# Patient Record
Sex: FEMALE | Race: WHITE
[De-identification: names, ages, dates, MRNs, and addresses within clinical notes are randomized per-mention and may not be internally consistent; named-entity substitution may affect disease eponyms.]

---

## 2017-10-12 PROBLEM — Z00.00 ENCOUNTER FOR PREVENTIVE HEALTH EXAMINATION: Status: ACTIVE | Noted: 2017-10-12

## 2018-01-08 ENCOUNTER — APPOINTMENT (OUTPATIENT)
Dept: OBGYN | Facility: CLINIC | Age: 38
End: 2018-01-08

## 2018-02-05 ENCOUNTER — APPOINTMENT (OUTPATIENT)
Dept: OBGYN | Facility: CLINIC | Age: 38
End: 2018-02-05
Payer: COMMERCIAL

## 2018-02-05 VITALS
HEIGHT: 64 IN | WEIGHT: 130 LBS | SYSTOLIC BLOOD PRESSURE: 146 MMHG | BODY MASS INDEX: 22.2 KG/M2 | DIASTOLIC BLOOD PRESSURE: 80 MMHG

## 2018-02-05 DIAGNOSIS — Z80.9 FAMILY HISTORY OF MALIGNANT NEOPLASM, UNSPECIFIED: ICD-10-CM

## 2018-02-05 DIAGNOSIS — B97.7 PAPILLOMAVIRUS AS THE CAUSE OF DISEASES CLASSIFIED ELSEWHERE: ICD-10-CM

## 2018-02-05 DIAGNOSIS — Z80.1 FAMILY HISTORY OF MALIGNANT NEOPLASM OF TRACHEA, BRONCHUS AND LUNG: ICD-10-CM

## 2018-02-05 PROCEDURE — 99385 PREV VISIT NEW AGE 18-39: CPT

## 2018-02-07 LAB — HPV HIGH+LOW RISK DNA PNL CVX: NOT DETECTED

## 2018-02-09 LAB — CYTOLOGY CVX/VAG DOC THIN PREP: NORMAL

## 2018-11-19 ENCOUNTER — APPOINTMENT (OUTPATIENT)
Dept: OBGYN | Facility: CLINIC | Age: 38
End: 2018-11-19
Payer: COMMERCIAL

## 2018-11-19 VITALS
HEIGHT: 64 IN | DIASTOLIC BLOOD PRESSURE: 79 MMHG | WEIGHT: 135 LBS | SYSTOLIC BLOOD PRESSURE: 123 MMHG | BODY MASS INDEX: 23.05 KG/M2

## 2018-11-19 DIAGNOSIS — R10.9 UNSPECIFIED ABDOMINAL PAIN: ICD-10-CM

## 2018-11-19 DIAGNOSIS — R35.0 FREQUENCY OF MICTURITION: ICD-10-CM

## 2018-11-19 PROCEDURE — 99213 OFFICE O/P EST LOW 20 MIN: CPT

## 2018-11-19 RX ORDER — IBUPROFEN 800 MG/1
800 TABLET, FILM COATED ORAL
Qty: 60 | Refills: 0 | Status: ACTIVE | COMMUNITY
Start: 2018-11-08

## 2018-11-27 LAB
BACTERIA UR CULT: NORMAL
CANDIDA VAG CYTO: NOT DETECTED
G VAGINALIS+PREV SP MTYP VAG QL MICRO: NOT DETECTED
T VAGINALIS VAG QL WET PREP: NOT DETECTED

## 2019-06-03 ENCOUNTER — APPOINTMENT (OUTPATIENT)
Dept: OBGYN | Facility: CLINIC | Age: 39
End: 2019-06-03
Payer: COMMERCIAL

## 2019-06-03 VITALS
HEIGHT: 63 IN | BODY MASS INDEX: 24.45 KG/M2 | SYSTOLIC BLOOD PRESSURE: 109 MMHG | WEIGHT: 138 LBS | DIASTOLIC BLOOD PRESSURE: 74 MMHG

## 2019-06-03 DIAGNOSIS — R92.2 INCONCLUSIVE MAMMOGRAM: ICD-10-CM

## 2019-06-03 DIAGNOSIS — Z01.419 ENCOUNTER FOR GYNECOLOGICAL EXAMINATION (GENERAL) (ROUTINE) W/OUT ABNORMAL FINDINGS: ICD-10-CM

## 2019-06-03 PROCEDURE — 99396 PREV VISIT EST AGE 40-64: CPT

## 2019-06-03 NOTE — HISTORY OF PRESENT ILLNESS
[1 Year Ago] : 1 year ago [Good] : being in good health [Healthy Diet] : a healthy diet [Regular Exercise] : regular exercise [Mammogram Last Year] : a mammogram last year [Pap Smear Last Year] : Papanicolaou cytology last year [Weight Concerns] : no concerns with her weight [Menstrual Problems] : reports normal menses [Up to Date] : up to date with ~his/her~ immunizations [Normal Amount/Duration] : was of a normal amount and duration [Regular Cycle Intervals] : periods have been regular [Spotting Between  Menses] : no spotting between menses [Sexually Active] : is sexually active

## 2019-06-03 NOTE — PHYSICAL EXAM
[Awake] : awake [Acute Distress] : no acute distress [Alert] : alert [Mass] : no breast mass [Nipple Discharge] : no nipple discharge [Axillary LAD] : no axillary lymphadenopathy [Oriented x3] : oriented to person, place, and time [Soft] : soft [Tender] : non tender [No Bleeding] : there was no active vaginal bleeding [Normal] : cervix [Uterine Adnexae] : were not tender and not enlarged

## 2019-06-05 LAB — HPV HIGH+LOW RISK DNA PNL CVX: NOT DETECTED

## 2019-06-06 LAB — CYTOLOGY CVX/VAG DOC THIN PREP: NORMAL

## 2019-06-24 ENCOUNTER — APPOINTMENT (OUTPATIENT)
Dept: DERMATOLOGY | Facility: CLINIC | Age: 39
End: 2019-06-24
Payer: COMMERCIAL

## 2019-06-24 PROCEDURE — 99203 OFFICE O/P NEW LOW 30 MIN: CPT

## 2020-02-14 ENCOUNTER — TRANSCRIPTION ENCOUNTER (OUTPATIENT)
Age: 40
End: 2020-02-14

## 2020-02-18 ENCOUNTER — APPOINTMENT (OUTPATIENT)
Dept: OBGYN | Facility: CLINIC | Age: 40
End: 2020-02-18

## 2020-03-04 ENCOUNTER — APPOINTMENT (OUTPATIENT)
Dept: OBGYN | Facility: CLINIC | Age: 40
End: 2020-03-04

## 2020-06-17 ENCOUNTER — APPOINTMENT (OUTPATIENT)
Dept: DERMATOLOGY | Facility: CLINIC | Age: 40
End: 2020-06-17
Payer: COMMERCIAL

## 2020-06-17 PROCEDURE — 99213 OFFICE O/P EST LOW 20 MIN: CPT | Mod: 95

## 2020-08-28 ENCOUNTER — EMERGENCY (EMERGENCY)
Facility: HOSPITAL | Age: 40
LOS: 1 days | Discharge: HOME | End: 2020-08-28
Attending: EMERGENCY MEDICINE
Payer: COMMERCIAL

## 2020-08-28 VITALS
SYSTOLIC BLOOD PRESSURE: 131 MMHG | HEART RATE: 96 BPM | RESPIRATION RATE: 12 BRPM | WEIGHT: 145.95 LBS | HEIGHT: 66 IN | OXYGEN SATURATION: 96 % | TEMPERATURE: 98 F | DIASTOLIC BLOOD PRESSURE: 81 MMHG

## 2020-08-28 VITALS
RESPIRATION RATE: 15 BRPM | TEMPERATURE: 98 F | DIASTOLIC BLOOD PRESSURE: 89 MMHG | OXYGEN SATURATION: 98 % | HEART RATE: 89 BPM | SYSTOLIC BLOOD PRESSURE: 131 MMHG

## 2020-08-28 LAB
ALBUMIN SERPL ELPH-MCNC: 4.4 G/DL — SIGNIFICANT CHANGE UP (ref 3.3–5)
ALP SERPL-CCNC: 52 U/L — SIGNIFICANT CHANGE UP (ref 40–120)
ALT FLD-CCNC: 14 U/L — SIGNIFICANT CHANGE UP (ref 10–45)
ANION GAP SERPL CALC-SCNC: 8 MMOL/L — SIGNIFICANT CHANGE UP (ref 5–17)
AST SERPL-CCNC: 19 U/L — SIGNIFICANT CHANGE UP (ref 10–40)
BILIRUB SERPL-MCNC: 0.4 MG/DL — SIGNIFICANT CHANGE UP (ref 0.2–1.2)
BUN SERPL-MCNC: 12 MG/DL — SIGNIFICANT CHANGE UP (ref 7–23)
CALCIUM SERPL-MCNC: 9.6 MG/DL — SIGNIFICANT CHANGE UP (ref 8.4–10.5)
CHLORIDE SERPL-SCNC: 105 MMOL/L — SIGNIFICANT CHANGE UP (ref 96–108)
CK SERPL-CCNC: 49 U/L — SIGNIFICANT CHANGE UP (ref 25–170)
CO2 SERPL-SCNC: 28 MMOL/L — SIGNIFICANT CHANGE UP (ref 22–31)
CREAT SERPL-MCNC: 0.7 MG/DL — SIGNIFICANT CHANGE UP (ref 0.5–1.3)
CRP SERPL-MCNC: <0.06 MG/DL — SIGNIFICANT CHANGE UP (ref 0–0.4)
GLUCOSE SERPL-MCNC: 94 MG/DL — SIGNIFICANT CHANGE UP (ref 70–99)
HCG UR QL: NEGATIVE — SIGNIFICANT CHANGE UP
POTASSIUM SERPL-MCNC: 4 MMOL/L — SIGNIFICANT CHANGE UP (ref 3.5–5.3)
POTASSIUM SERPL-SCNC: 4 MMOL/L — SIGNIFICANT CHANGE UP (ref 3.5–5.3)
PROT SERPL-MCNC: 6.7 G/DL — SIGNIFICANT CHANGE UP (ref 6–8.3)
SODIUM SERPL-SCNC: 141 MMOL/L — SIGNIFICANT CHANGE UP (ref 135–145)

## 2020-08-28 PROCEDURE — 86780 TREPONEMA PALLIDUM: CPT

## 2020-08-28 PROCEDURE — 99284 EMERGENCY DEPT VISIT MOD MDM: CPT

## 2020-08-28 PROCEDURE — 80061 LIPID PANEL: CPT

## 2020-08-28 PROCEDURE — 85652 RBC SED RATE AUTOMATED: CPT

## 2020-08-28 PROCEDURE — 80053 COMPREHEN METABOLIC PANEL: CPT

## 2020-08-28 PROCEDURE — 81025 URINE PREGNANCY TEST: CPT

## 2020-08-28 PROCEDURE — 82550 ASSAY OF CK (CPK): CPT

## 2020-08-28 PROCEDURE — 83036 HEMOGLOBIN GLYCOSYLATED A1C: CPT

## 2020-08-28 PROCEDURE — 82607 VITAMIN B-12: CPT

## 2020-08-28 PROCEDURE — 86140 C-REACTIVE PROTEIN: CPT

## 2020-08-28 PROCEDURE — 82746 ASSAY OF FOLIC ACID SERUM: CPT

## 2020-08-28 NOTE — ED PROVIDER NOTE - ATTENDING CONTRIBUTION TO CARE
------------ATTENDING NOTE------------   pt c/o christina ------------ATTENDING NOTE------------   pt c/o one week of constant worsening numbness/tingling in bilateral feet, associated difficulty walking, no DM, no alcohol use, no drug/intoxicant use, no edema/calf tenderness or color changes, +2 DP/PT pulses, benign back exam, no incontinence/retention, recent cmp/cbc/tsh wnl, ED Sign Out pending labs/Neuro consult for recs/dispo.  - Ricardo Barahona MD   -----------------------------------------------

## 2020-08-28 NOTE — ED PROVIDER NOTE - OBJECTIVE STATEMENT
41 y/o F denies significant PMH presents to ED with b/l lower extremity pain, numbness, burning, tingling worsening over the last few weeks. Patient states that she had atraumatic R foot swelling and pain a few weeks ago. Went to a podiatrist, who performed a xay. Patient was told he was a neuroma. Placed on a steroid pack. Patient states that since then pain has worsened, with numbness and tingling. In the last week patient states that pain, numbness, tingling, burning has migrated also to left lower extremity. Patient has received outpatient bloodwork via PCP, per patient was negative.   States that she had full workup with blood counts, inflammatory markers, and testing for lyme disease, all returning negative.   Currently, patient states that she is experiencing right foot numbness and left foot and lower extremity pain. Declines pain medication.   PSH: Tonsillectomy   Allergies: Sulfa drugs  to be completed.. 41 y/o F denies significant PMH presents to ED with b/l lower extremity pain, numbness, burning, tingling worsening over the last few weeks. Patient states that she had atraumatic R foot swelling and pain a few weeks ago. Went to a podiatrist, who performed a xray. Patient was told he was a neuroma. Placed on a steroid pack. Patient states that since then pain has worsened, with numbness and tingling. In the last week patient states that pain, numbness, tingling, burning has migrated also to left lower extremity. Patient has received outpatient blood work via PCP, per patient was negative.   States that she had full workup with blood counts, inflammatory markers, and testing for lyme disease, all returning negative.   Currently, patient states that she is experiencing right foot numbness and left foot and lower extremity pain. Declines pain medication.   PSH: Tonsillectomy   Allergies: Sulfa drugs  Denies chest pain, SOB, fever, urinary symptom, abdominal pain, vomiting, headache, dizziness.

## 2020-08-28 NOTE — ED PROVIDER NOTE - NSFOLLOWUPCLINICS_GEN_ALL_ED_FT
[facility] Gynecology and Obstetrics  Gynceology/OB  [address] [place] [address]  Phone: [phone]  Fax:   Follow Up Time: Urgent [facility]  Neurology  [address] [place] [address] [address] [place] [address]  Phone: [phone]  Fax:   Follow Up Time:

## 2020-08-28 NOTE — CONSULT NOTE ADULT - ASSESSMENT
Patient is a 39 y/o female with PMH remote EBV infection (~15 years ago) who presents with lower extremity pain, numbness, and tingling for two weeks. Neurological exam was significant for hyperesthesia of her left lower leg below the knee compared to her right lower extremity. Patient did not want to stay for further imaging after discussion of risks and benefits. Labs were collected prior to discharge.    Impression: Peripheral neuropathy vs myopathy, unknown etiology; DDX includes undiagnosed T2DM vs. vitamin B12 deficiency vs. other axonal vs. demyelinating neuropathy vs. Raynaud's phenomenon vs. psychosomatic condition (diagnosis of exclusion), less likely autoimmune etiology or peripheral vascular disease    Recommendations:  -Vitamin B12, MMA, homocysteine, CK, HbA1C , ESR, CRP  -Follow up with Dr. Nissenbaum as outpatient at [facility], [place] [address]; [phone]  -Patient can follow up with Neuromuscular specialist, Dr. Messer, as outpatient for NCS/EMG at [address] [place] [address]. ([phone]).    Patient discussed with Dr. Moyer, Neurology Attending.    Thank you.  Ana Otero,   PGY-2  Neurology Resident

## 2020-08-28 NOTE — ED PROVIDER NOTE - PATIENT PORTAL LINK FT
You can access the FollowMyHealth Patient Portal offered by [facility] by registering at the following website: [email or web address]. By joining [employer]’s FollowMyHealth portal, you will also be able to view your health information using other applications (apps) compatible with our system. You can access the FollowMyHealth Patient Portal offered by [facility] by registering at the following website: [email or web address]. By joining [employer]’s FollowMyHealth portal, you will also be able to view your health information using other applications (apps) compatible with our system.

## 2020-08-28 NOTE — ED ADULT NURSE REASSESSMENT NOTE - NS ED NURSE REASSESS COMMENT FT1
report received from barbara banegas. patient resting on stretcher. patient complaining of bilateral lower extremity pain/discomfort. as per ABIDA Cobb patient refusing care at this time. will continue to monitor. patient comfort and safety provided.

## 2020-08-28 NOTE — ED PROVIDER NOTE - PHYSICAL EXAMINATION
GEN: Anxious, Nontoxic, NAD  HEENT: NC/AT, Symm Facies. PERRL, EOMI  CV: No JVD/Bruits or stridor;  +S1S2, RRR w/o m/g/r  RESP: CTAB w/o w/r/r  ABD: Soft, nt/nd, +BS. No guarding/rebound. No RUQ tender, no CVAT  EXT/MSK: No lower extremity edema or calf tenderness.  FROMx4  SKIN: Warm   Neuro: Grossly intact, AOX3 with normal speech, Sensation intact, motor equal b/l, equal  strength GEN: Anxious, Nontoxic, NAD  HEENT: NC/AT, Symm Facies. PERRL, EOMI  CV: No JVD/Bruits or stridor;  +S1S2, RRR w/o m/g/r  RESP: CTAB w/o w/r/r  ABD: Soft, nt/nd, +BS. No guarding/rebound. No RUQ tender, no CVAT  EXT/MSK: No lower extremity edema or calf tenderness.  FROMx4  SKIN: Warm   Neuro: Grossly intact, AOX3 with normal speech, Sensation equal b/l, motor equal b/l, equal  strength

## 2020-08-28 NOTE — CONSULT NOTE ADULT - SUBJECTIVE AND OBJECTIVE BOX
HPI:  Patient is a 41 y/o female with PMH remote EBV infection (~15 years ago) who presents with lower extremity pain, numbness, and tingling for two weeks. Patient reports she has had symptoms of both feet since Saturday, pain rated an 8/10, for which she was taking OTC medications. The initial presentation started two weeks ago with numbness and tingling of her right foot, with absence of any pain. Patient denies trauma to the foot, however when she was dancing 1 month ago, she rolled over her foot (not ankle). She did not have trouble bearing weight on it immediately afterwards. Patient did not note any swelling, but what appeared to be bruising in the area. She had an outpatient X-Ray of the foot, which was negative for acute fracture. Patient was diagnosed with a neuroma between the third and fourth digits of the foot and completed a 6-day course of steroids. Patient describes a cramping sensation like a Charley Horse associated with the paresthesias that radiated to the lateral part of her lower leg starting Saturday, 8/22/20, which progressed to include her left foot as well. Since then, she has felt both of her legs are feel heavier and weaker. Of note, patient had a workup done for peripheral neuropathy as outpatient: EMMANUEL, RF, TSH, CBC, BMP were unremarkable.    Patient has been home since the onset of COVID in March, she used to work as a . She admits to. She denies back pain, but occasionally has muscle pain of her left buttock that is not accompanied with burning radiation down her leg. She attributes the pain also to sitting an excessive amount during the day, more than usual since prior to COVID.    Patient denies vision changes, headaches, chest pain, palpitations, dizziness, abdominal pain, nausea, vomiting, recent sickness, fevers, chills, weight loss, cough, shortness of breath, changes in diet, and sick contacts.    PAST MEDICAL & SURGICAL HISTORY:  No pertinent past medical history  No significant past surgical history    Allergies  sulfa drugs (Anaphylaxis)    SHx - No smoking, No ETOH, No drug abuse    Review of Systems:  HEENT:  No visual loss, blurred vision, double vision.  No hearing loss, sneezing, congestion, runny nose or sore throat.  SKIN:  No rash or itching.  CARDIOVASCULAR:  No chest pain, chest pressure or chest discomfort. No palpitations or edema.  RESPIRATORY:  No shortness of breath, cough or sputum.  GASTROINTESTINAL:  No anorexia, nausea, vomiting or diarrhea. No abdominal pain.  GENITOURINARY:  No dysuria. No increased frequency. No retention. No incontinence.  NEUROLOGICAL:  See HPI  MUSCULOSKELETAL:  No muscle, back pain, joint pain or stiffness.    Vital Signs Last 24 Hrs  T(C): 36.8 (28 Aug 2020 19:21), Max: 36.8 (28 Aug 2020 15:24)  T(F): 98.2 (28 Aug 2020 19:21), Max: 98.2 (28 Aug 2020 15:24)  HR: 89 (28 Aug 2020 19:21) (89 - 96)  BP: 131/89 (28 Aug 2020 19:21) (131/81 - 131/89)  BP(mean): --  RR: 15 (28 Aug 2020 19:21) (12 - 15)  SpO2: 98% (28 Aug 2020 19:21) (96% - 98%)    PHYSICAL EXAM:  GENERAL: No acute distress, however very anxious  HEENT: Normocephalic; conjunctivae and sclerae clear; moist mucous membranes  NECK: Supple  ABDOMEN: Soft, non-tender, non-distended  EXTREMITIES: No cyanosis; no edema; no calf tenderness; no lower extremity swelling or erythema  SKIN: Warm and dry; no rash             Neurological Exam:  - Mental Status: Alert and oriented to person, place, month, day, year, president, situation; speech is fluent with intact naming, repetition, and comprehension  - Cranial Nerves II-XII:    II:  PERRLA; visual fields are full to confrontation  III, IV, VI:  EOMI, no nystagmus  V:  facial sensation is intact in the V1-V3 distribution bilaterally.  VII:  face is symmetric with normal eye closure and smile  VIII:  hearing is intact to finger rub  IX, X:  uvula is midline and soft palate rises symmetrically  XI:  head turning and shoulder shrug are intact bilaterally  XII:  tongue protrudes in the midline  - Motor:  strength is 5/5 throughout; normal muscle bulk and tone throughout; no pronator drift  - Reflexes:  2+ and symmetric at the biceps, triceps, brachioradialis, knees, and ankles;  plantar reflexes downgoing bilaterally  - Sensory: increased sensitivity to light touch and pin prick of LLE below the knee compared to RLE; upper extremity sensation intact and symmetrical to light touch and pinprick; vibration and joint-position intact bilaterally  - Coordination:  finger-nose-finger and heel-knee-shin intact without dysmetria; rapid alternating hand movements intact  - Gait:  normal steps, base, arm swing, and turning; heel and toe walking are normal; tandem gait is normal; Romberg testing is negative    Other:    08-28    141  |  105  |  12  ----------------------------<  94  4.0   |  28  |  0.70    Ca    9.6      28 Aug 2020 18:01    TPro  6.7  /  Alb  4.4  /  TBili  0.4  /  DBili  x   /  AST  19  /  ALT  14  /  AlkPhos  52  08-28

## 2020-08-28 NOTE — ED PROVIDER NOTE - PROGRESS NOTE DETAILS
Spoke with ABIDA Thurston from [facility] (patient follows with a ABIDA ELIZONDO). States that CBC, CMP, RF, EMMANUEL, ESR, Lyme titer, TSH, was negative. EBV + for past infection. - ARELI PrattC Spoke with neurology, consult placed for peripheral neuropathy and gait disturbances. - Marcela Hernandez PA-C pt was offered CDU for MRI however she prefers to follow up as outpt. discussed with neurology, given patient able to ambulate and no acute prgoressive neurologic findings, appropriate. labs were drawn and sent. SHEBA Spoke with neurology resident Dr. Otero. Recommending Folate, Lipid, A1C, B12, CRP, CK, ESR. States that patient can follow up outpatient in clinic within the week. Outpatient EMG TBD. LUZMA Truong, patient to be dc home. - Marcela Hernandez PA-C

## 2020-08-28 NOTE — ED PROVIDER NOTE - NSFOLLOWUPINSTRUCTIONS_ED_ALL_ED_FT
See your Primary Doctor and Gynecology this week -- call to discuss.    See HEART BURN and ABDOMINAL PAIN information and return instructions given to you.    Seek immediate medical care for new/worsening symptoms/concerns. 1. Rest. Stay hydrated.   2. Continue your current home medications.  3. Follow-up with your medical doctor in 2-3 days.  Please follow up with Dr. Nissenbaum (neurology) within 1 week, call the phone number above. Bring your results with you for follow-up. Outpatient EMG to be determined.  4. Return to the ER if you have any new or worsening symptoms, incontinence, falls, worsening pain, change in skin color, chest pain, difficulty breathing, fevers, chills, weakness, or any other concerns.

## 2020-08-28 NOTE — ED PROVIDER NOTE - CARE PLAN
Plastic Surgery Principal Discharge DX:	Dyspepsia  Secondary Diagnosis:	Menstrual cramps Principal Discharge DX:	Neuropathy

## 2020-08-28 NOTE — ED PROVIDER NOTE - NS ED ROS FT
Constitutional: No fever or chills  Eyes: No visual changes  CV: No chest pain or lower extremity edema  Resp: No SOB no cough  GI: No abd pain. No nausea or vomiting.   : No dysuria, hematuria.   MSK: +lower extremity pain  Skin: No rash  Psych: No complaints   Neuro: No headache. +numbness +tingling. + weakness.

## 2020-08-28 NOTE — ED ADULT NURSE NOTE - OBJECTIVE STATEMENT
40 year old female presetns to the ED complaining of pain in the lower extremeties that started near the toes of her right foot x 6 days ago and has moved to be bilateral and ascending up her legs. Pt descibs the pain as, "I feel like it's almost a burining, at night it's worse like 40 year old female presents to the ED complaining of pain in the lower extremities that started near the toes of her right foot x 6 days ago and has moved to be bilateral and ascending up her legs. Pt describes the pain as, "I feel like it's almost a burning, at night it's worse like the bone is being cut". Pt is A&O x 4, VSS, afebrile, ambulating independently. Pt denies fever, chills, NVD, SOB, or chest pain. IV placed, labs drawn, bed in low position, safety measures in place. Pt was treated with 6 days of steroids with no improvement.

## 2020-08-28 NOTE — CONSULT NOTE ADULT - NSHPATTENDINGPLANDISCUSS_GEN_ALL_CORE
neurology resident yesterday evening on call and I agree with assessment and plan and outpatient neurology follow up and all information given.

## 2020-08-29 LAB
A1C WITH ESTIMATED AVERAGE GLUCOSE RESULT: 5.2 % — SIGNIFICANT CHANGE UP (ref 4–5.6)
CHOLEST SERPL-MCNC: 173 MG/DL — SIGNIFICANT CHANGE UP (ref 10–199)
ERYTHROCYTE [SEDIMENTATION RATE] IN BLOOD: 8 MM/HR — SIGNIFICANT CHANGE UP (ref 0–15)
ESTIMATED AVERAGE GLUCOSE: 103 MG/DL — SIGNIFICANT CHANGE UP (ref 68–114)
FOLATE SERPL-MCNC: >20 NG/ML — SIGNIFICANT CHANGE UP
HDLC SERPL-MCNC: 61 MG/DL — SIGNIFICANT CHANGE UP
LIPID PNL WITH DIRECT LDL SERPL: 98 MG/DL — SIGNIFICANT CHANGE UP
T PALLIDUM AB TITR SER: NEGATIVE — SIGNIFICANT CHANGE UP
TOTAL CHOLESTEROL/HDL RATIO MEASUREMENT: 2.8 RATIO — LOW (ref 3.3–7.1)
TRIGL SERPL-MCNC: 68 MG/DL — SIGNIFICANT CHANGE UP (ref 10–149)
VIT B12 SERPL-MCNC: 716 PG/ML — SIGNIFICANT CHANGE UP (ref 232–1245)

## 2020-08-31 ENCOUNTER — APPOINTMENT (OUTPATIENT)
Dept: NEUROLOGY | Facility: CLINIC | Age: 40
End: 2020-08-31
Payer: COMMERCIAL

## 2020-08-31 VITALS
WEIGHT: 146 LBS | SYSTOLIC BLOOD PRESSURE: 114 MMHG | HEIGHT: 63 IN | BODY MASS INDEX: 25.87 KG/M2 | TEMPERATURE: 97.9 F | HEART RATE: 82 BPM | DIASTOLIC BLOOD PRESSURE: 74 MMHG

## 2020-08-31 DIAGNOSIS — M79.672 PAIN IN RIGHT LEG: ICD-10-CM

## 2020-08-31 DIAGNOSIS — M79.605 PAIN IN RIGHT LEG: ICD-10-CM

## 2020-08-31 DIAGNOSIS — M79.604 PAIN IN RIGHT LEG: ICD-10-CM

## 2020-08-31 DIAGNOSIS — M54.42 LUMBAGO WITH SCIATICA, LEFT SIDE: ICD-10-CM

## 2020-08-31 DIAGNOSIS — R20.2 ANESTHESIA OF SKIN: ICD-10-CM

## 2020-08-31 DIAGNOSIS — M79.671 PAIN IN RIGHT LEG: ICD-10-CM

## 2020-08-31 DIAGNOSIS — M21.372 FOOT DROP, LEFT FOOT: ICD-10-CM

## 2020-08-31 DIAGNOSIS — R20.0 ANESTHESIA OF SKIN: ICD-10-CM

## 2020-08-31 PROBLEM — Z78.9 OTHER SPECIFIED HEALTH STATUS: Chronic | Status: ACTIVE | Noted: 2020-08-28

## 2020-08-31 PROCEDURE — 99204 OFFICE O/P NEW MOD 45 MIN: CPT

## 2020-08-31 NOTE — REVIEW OF SYSTEMS
[Numbness] : numbness [Abnormal Sensation] : an abnormal sensation [Tingling] : tingling [Difficulty Walking] : difficulty walking [de-identified] : Left foot feels different\par LBP [Negative] : Heme/Lymph

## 2020-08-31 NOTE — PHYSICAL EXAM
[General Appearance - Alert] : alert [General Appearance - In No Acute Distress] : in no acute distress [Oriented To Time, Place, And Person] : oriented to person, place, and time [Impaired Insight] : insight and judgment were intact [Affect] : the affect was normal [FreeTextEntry1] : Anxious [Person] : oriented to person [Place] : oriented to place [Time] : oriented to time [Naming Objects] : no difficulty naming common objects [Concentration Intact] : normal concentrating ability [Visual Intact] : visual attention was ~T not ~L decreased [Writing A Sentence] : no difficulty writing a sentence [Repeating Phrases] : no difficulty repeating a phrase [Fluency] : fluency intact [Comprehension] : comprehension intact [Reading] : reading intact [Past History] : adequate knowledge of personal past history [Cranial Nerves Optic (II)] : visual acuity intact bilaterally,  visual fields full to confrontation, pupils equal round and reactive to light [Cranial Nerves Oculomotor (III)] : extraocular motion intact [Cranial Nerves Trigeminal (V)] : facial sensation intact symmetrically [Cranial Nerves Glossopharyngeal (IX)] : tongue and palate midline [Cranial Nerves Vestibulocochlear (VIII)] : hearing was intact bilaterally [Cranial Nerves Facial (VII)] : face symmetrical [Cranial Nerves Hypoglossal (XII)] : there was no tongue deviation with protrusion [Cranial Nerves Accessory (XI - Cranial And Spinal)] : head turning and shoulder shrug symmetric [Motor Strength] : muscle strength was normal in all four extremities [No Muscle Atrophy] : normal bulk in all four extremities [Sensation Tactile Decrease] : light touch was intact [Hyperesthesia] : hyperesthesia was present [Balance] : balance was intact [Past-pointing] : there was no past-pointing [Tremor] : no tremor present [0] : Ankle jerk left 0 [2+] : Ankle jerk right 2+ [Plantar Reflex Left Only] : normal on the left [Plantar Reflex Right Only] : normal on the right [FreeTextEntry7] : Left Leg and foot  [FreeTextEntry6] : Left toes dorsiflexor weakness  [FreeTextEntry8] : Able to ambulate with heel and toe walk [PERRL With Normal Accommodation] : pupils were equal in size, round, reactive to light, with normal accommodation [Sclera] : the sclera and conjunctiva were normal [Outer Ear] : the ears and nose were normal in appearance [Extraocular Movements] : extraocular movements were intact [Neck Cervical Mass (___cm)] : no neck mass was observed [Neck Appearance] : the appearance of the neck was normal [Oropharynx] : the oropharynx was normal [Jugular Venous Distention Increased] : there was no jugular-venous distention [Thyroid Diffuse Enlargement] : the thyroid was not enlarged [Thyroid Nodule] : there were no palpable thyroid nodules [Auscultation Breath Sounds / Voice Sounds] : lungs were clear to auscultation bilaterally [Heart Rate And Rhythm] : heart rate was normal and rhythm regular [Heart Sounds] : normal S1 and S2 [Heart Sounds Gallop] : no gallops [Heart Sounds Pericardial Friction Rub] : no pericardial rub [Murmurs] : no murmurs [Full Pulse] : the pedal pulses are present [Edema] : there was no peripheral edema [Bowel Sounds] : normal bowel sounds [Abdomen Soft] : soft [Abdomen Tenderness] : non-tender [Abdomen Mass (___ Cm)] : no abdominal mass palpated [No CVA Tenderness] : no ~M costovertebral angle tenderness [No Spinal Tenderness] : no spinal tenderness [Abnormal Walk] : normal gait [Nail Clubbing] : no clubbing  or cyanosis of the fingernails [Motor Tone] : muscle strength and tone were normal [Musculoskeletal - Swelling] : no joint swelling seen [Skin Turgor] : normal skin turgor [Skin Color & Pigmentation] : normal skin color and pigmentation [] : no rash

## 2020-08-31 NOTE — REASON FOR VISIT
[Consultation] : a consultation visit [FreeTextEntry1] : Evaluation for paresthesias and tingling of left > Rt foot for 2 weeks

## 2020-08-31 NOTE — HISTORY OF PRESENT ILLNESS
[FreeTextEntry1] : She is a 40-year-old patient coming here for evaluation of bilateral foot numbness and tingling for 2 weeks. Patient was seen and evaluated [facility] ED by neurology resident symptoms started 2 weeks prior to ED evaluation started with pain and tingling involving right foot but the pain initially while she was doing a online yoga/dance course. Seen by Podiatrist who did x-rays and told her that she might had a neuroma and recommended followup evaluation which patient did not go and instead went for massage therapy as she felt her symptoms and left foot subsequently and back pain started. Symptoms improved initially and then bilateral lower extremity symptoms progressed with burning sensation and radiating pain started with paresthesias in both feet. For which she went to the ED where she was evaluated.\par Recommend an MRI scan but she did not want to wait and left the ER.\par Denies of any upper extremity symptoms or headaches. No dizziness or other focal symptoms.\par Extremely anxious and nervous. No bladder or bowel symptoms. Feels that "her left foot doesn't feel the same"as the right.

## 2020-09-04 ENCOUNTER — RESULT REVIEW (OUTPATIENT)
Age: 40
End: 2020-09-04

## 2020-09-04 ENCOUNTER — APPOINTMENT (OUTPATIENT)
Dept: MRI IMAGING | Facility: CLINIC | Age: 40
End: 2020-09-04

## 2020-09-04 ENCOUNTER — OUTPATIENT (OUTPATIENT)
Dept: OUTPATIENT SERVICES | Facility: HOSPITAL | Age: 40
LOS: 1 days | End: 2020-09-04
Payer: COMMERCIAL

## 2020-09-04 ENCOUNTER — APPOINTMENT (OUTPATIENT)
Dept: MRI IMAGING | Facility: HOSPITAL | Age: 40
End: 2020-09-04
Payer: COMMERCIAL

## 2020-09-04 DIAGNOSIS — M54.42 LUMBAGO WITH SCIATICA, LEFT SIDE: ICD-10-CM

## 2020-09-04 PROCEDURE — 72148 MRI LUMBAR SPINE W/O DYE: CPT | Mod: 26

## 2020-09-04 PROCEDURE — 72146 MRI CHEST SPINE W/O DYE: CPT | Mod: 26

## 2020-09-04 PROCEDURE — 72148 MRI LUMBAR SPINE W/O DYE: CPT

## 2020-09-04 PROCEDURE — 72146 MRI CHEST SPINE W/O DYE: CPT

## 2021-04-21 ENCOUNTER — APPOINTMENT (OUTPATIENT)
Dept: DERMATOLOGY | Facility: CLINIC | Age: 41
End: 2021-04-21
Payer: COMMERCIAL

## 2021-04-21 PROCEDURE — 99213 OFFICE O/P EST LOW 20 MIN: CPT

## 2021-04-21 PROCEDURE — 99072 ADDL SUPL MATRL&STAF TM PHE: CPT

## 2021-12-08 ENCOUNTER — NON-APPOINTMENT (OUTPATIENT)
Age: 41
End: 2021-12-08

## 2022-03-30 ENCOUNTER — APPOINTMENT (OUTPATIENT)
Dept: DERMATOLOGY | Facility: CLINIC | Age: 42
End: 2022-03-30
Payer: COMMERCIAL

## 2022-03-30 PROCEDURE — 99213 OFFICE O/P EST LOW 20 MIN: CPT

## 2022-04-22 ENCOUNTER — TRANSCRIPTION ENCOUNTER (OUTPATIENT)
Age: 42
End: 2022-04-22

## 2022-04-25 ENCOUNTER — APPOINTMENT (OUTPATIENT)
Dept: DERMATOLOGY | Facility: CLINIC | Age: 42
End: 2022-04-25
Payer: COMMERCIAL

## 2022-04-25 PROCEDURE — 99213 OFFICE O/P EST LOW 20 MIN: CPT

## 2023-07-21 NOTE — ED ADULT NURSE NOTE - NS ED NURSE RECORD ANOTHER VITAL SIGN
Include J-Code In Bill: No Date Of Next Injection: Other Time Frame (Enter Below) J-Code:  20097 Billing Preferences: 1 Ndc (300 Mg Prefilled Pen): [address]-88 Use Enhanced Ndc?: Yes Expiration Date (Optional): 10/2025 Yes Other Time Frame Value: 1 month Ndc (300 Mg Prefilled Syringe): [address]-54 Detail Level: None Consent: The risks of pain and injection site reactions were reviewed with the patient prior to the injection. Dupixent Amount: 300 mg Syringe Size Used (Required For Enhanced Ndc): 300 mg/2ml prefilled syringe Lot # (Optional): [id number] Administered By (Optional): Dr. Castano Ndc (200 Mg Prefilled Syringe): [address]-79

## 2023-09-27 ENCOUNTER — APPOINTMENT (OUTPATIENT)
Dept: DERMATOLOGY | Facility: CLINIC | Age: 43
End: 2023-09-27
Payer: COMMERCIAL

## 2023-09-27 PROCEDURE — 99213 OFFICE O/P EST LOW 20 MIN: CPT

## 2024-03-06 ENCOUNTER — APPOINTMENT (OUTPATIENT)
Dept: DERMATOLOGY | Facility: CLINIC | Age: 44
End: 2024-03-06
Payer: COMMERCIAL

## 2024-03-06 PROCEDURE — 11102 TANGNTL BX SKIN SINGLE LES: CPT

## 2024-03-06 PROCEDURE — 99213 OFFICE O/P EST LOW 20 MIN: CPT | Mod: 25

## 2024-03-14 ENCOUNTER — OFFICE (OUTPATIENT)
Dept: URBAN - METROPOLITAN AREA CLINIC 114 | Facility: CLINIC | Age: 44
Setting detail: OPHTHALMOLOGY
End: 2024-03-14
Payer: COMMERCIAL

## 2024-03-14 DIAGNOSIS — H01.005: ICD-10-CM

## 2024-03-14 DIAGNOSIS — H04.123: ICD-10-CM

## 2024-03-14 DIAGNOSIS — H01.002: ICD-10-CM

## 2024-03-14 PROCEDURE — 92014 COMPRE OPH EXAM EST PT 1/>: CPT | Performed by: SPECIALIST

## 2024-03-14 ASSESSMENT — REFRACTION_MANIFEST
OS_SPHERE: -3.00
OD_AXIS: 110
OS_CYLINDER: -0.75
OD_VA1: 20/20
OS_VA1: 20/20
OS_AXIS: 075
OD_CYLINDER: -1.00
OD_SPHERE: -3.00

## 2024-03-14 ASSESSMENT — REFRACTION_CURRENTRX
OD_SPHERE: -2.50
OS_AXIS: 072
OS_SPHERE: -3.00
OD_CYLINDER: -1.00
OD_AXIS: 137
OS_OVR_VA: 20/
OD_SPHERE: -3.00
OS_OVR_VA: 20/
OD_OVR_VA: 20/
OS_SPHERE: -2.50
OD_CYLINDER: -1.00
OS_CYLINDER: -0.50
OD_OVR_VA: 20/
OD_AXIS: 137
OS_CYLINDER: -0.75
OS_AXIS: 072

## 2024-03-14 ASSESSMENT — SPHEQUIV_DERIVED
OD_SPHEQUIV: -3.5
OS_SPHEQUIV: -3.375

## 2024-03-14 ASSESSMENT — LID EXAM ASSESSMENTS
OS_BLEPHARITIS: LLL T
OD_BLEPHARITIS: RLL T

## 2024-10-23 ENCOUNTER — APPOINTMENT (OUTPATIENT)
Dept: DERMATOLOGY | Facility: CLINIC | Age: 44
End: 2024-10-23
Payer: COMMERCIAL

## 2024-10-23 PROCEDURE — 99213 OFFICE O/P EST LOW 20 MIN: CPT

## 2025-03-27 ENCOUNTER — OFFICE (OUTPATIENT)
Dept: URBAN - METROPOLITAN AREA CLINIC 114 | Facility: CLINIC | Age: 45
Setting detail: OPHTHALMOLOGY
End: 2025-03-27
Payer: COMMERCIAL

## 2025-03-27 DIAGNOSIS — H01.002: ICD-10-CM

## 2025-03-27 DIAGNOSIS — H01.005: ICD-10-CM

## 2025-03-27 DIAGNOSIS — H04.123: ICD-10-CM

## 2025-03-27 PROCEDURE — 92014 COMPRE OPH EXAM EST PT 1/>: CPT | Performed by: SPECIALIST

## 2025-03-27 ASSESSMENT — REFRACTION_CURRENTRX
OD_VPRISM_DIRECTION: SV
OS_CYLINDER: -0.75
OS_OVR_VA: 20/
OS_SPHERE: -3.00
OD_OVR_VA: 20/
OS_OVR_VA: 20/
OS_VPRISM_DIRECTION: SV
OD_AXIS: 118
OD_CYLINDER: -1.00
OD_SPHERE: -3.00
OD_OVR_VA: 20/
OS_AXIS: 077

## 2025-03-27 ASSESSMENT — TONOMETRY
OS_IOP_MMHG: 18
OD_IOP_MMHG: 17

## 2025-03-27 ASSESSMENT — LID EXAM ASSESSMENTS
OD_BLEPHARITIS: RLL T
OS_BLEPHARITIS: LLL T

## 2025-03-27 ASSESSMENT — REFRACTION_MANIFEST
OD_CYLINDER: -1.50
OS_AXIS: 075
OS_VA1: 20/20
OD_AXIS: 115
OD_VA1: 20/20
OD_SPHERE: -3.25
OS_CYLINDER: -0.75
OS_SPHERE: -3.00

## 2025-03-27 ASSESSMENT — SUPERFICIAL PUNCTATE KERATITIS (SPK)
OD_SPK: ABSENT
OS_SPK: ABSENT

## 2025-03-27 ASSESSMENT — CONFRONTATIONAL VISUAL FIELD TEST (CVF)
OS_FINDINGS: FULL
OD_FINDINGS: FULL

## 2025-03-27 ASSESSMENT — REFRACTION_AUTOREFRACTION
OS_AXIS: 071
OS_CYLINDER: -1.00
OD_CYLINDER: -1.75
OS_SPHERE: -3.50
OD_SPHERE: -3.25
OD_AXIS: 114

## 2025-03-27 ASSESSMENT — VISUAL ACUITY
OS_BCVA: 20/20-2
OD_BCVA: 20/20

## 2025-06-24 ENCOUNTER — OFFICE (OUTPATIENT)
Dept: URBAN - METROPOLITAN AREA CLINIC 104 | Facility: CLINIC | Age: 45
Setting detail: OPHTHALMOLOGY
End: 2025-06-24
Payer: COMMERCIAL

## 2025-06-24 DIAGNOSIS — S05.02XA: ICD-10-CM

## 2025-06-24 DIAGNOSIS — H57.12: ICD-10-CM

## 2025-06-24 PROCEDURE — 92012 INTRM OPH EXAM EST PATIENT: CPT | Performed by: SPECIALIST

## 2025-06-24 ASSESSMENT — LID EXAM ASSESSMENTS
OS_BLEPHARITIS: LLL T
OD_BLEPHARITIS: RLL T

## 2025-06-24 ASSESSMENT — CONFRONTATIONAL VISUAL FIELD TEST (CVF)
OD_FINDINGS: FULL
OS_FINDINGS: FULL

## 2025-06-24 ASSESSMENT — VISUAL ACUITY
OD_BCVA: 20/25+1
OS_BCVA: 20/25-2

## 2025-06-24 ASSESSMENT — TONOMETRY: OS_IOP_MMHG: 16

## 2025-06-24 ASSESSMENT — SUPERFICIAL PUNCTATE KERATITIS (SPK)
OD_SPK: ABSENT
OS_SPK: ABSENT

## 2025-06-24 ASSESSMENT — CORNEAL TRAUMA - ABRASION: OS_ABRASION: PRESENT

## 2025-06-26 ENCOUNTER — RX ONLY (RX ONLY)
Age: 45
End: 2025-06-26

## 2025-06-26 ENCOUNTER — OFFICE (OUTPATIENT)
Dept: URBAN - METROPOLITAN AREA CLINIC 114 | Facility: CLINIC | Age: 45
Setting detail: OPHTHALMOLOGY
End: 2025-06-26
Payer: COMMERCIAL

## 2025-06-26 DIAGNOSIS — H57.12: ICD-10-CM

## 2025-06-26 DIAGNOSIS — S05.02XA: ICD-10-CM

## 2025-06-26 PROBLEM — H57.8A2 FOREIGN BODY SENSATION, LEFT EYE: Status: ACTIVE | Noted: 2025-06-24

## 2025-06-26 PROCEDURE — 92012 INTRM OPH EXAM EST PATIENT: CPT | Performed by: SPECIALIST

## 2025-06-26 ASSESSMENT — CORNEAL TRAUMA - ABRASION: OS_ABRASION: ABSENT

## 2025-06-26 ASSESSMENT — CONFRONTATIONAL VISUAL FIELD TEST (CVF)
OS_FINDINGS: FULL
OD_FINDINGS: FULL

## 2025-06-26 ASSESSMENT — VISUAL ACUITY
OS_BCVA: 20/20-2
OD_BCVA: 20/20-1

## 2025-06-26 ASSESSMENT — SUPERFICIAL PUNCTATE KERATITIS (SPK)
OS_SPK: ABSENT
OD_SPK: ABSENT

## 2025-06-26 ASSESSMENT — LID EXAM ASSESSMENTS
OD_BLEPHARITIS: RLL T
OS_BLEPHARITIS: LLL T

## 2025-06-26 ASSESSMENT — CORNEAL TRAUMA: OS_TRAUMA: RESOLVED
